# Patient Record
Sex: FEMALE | URBAN - METROPOLITAN AREA
[De-identification: names, ages, dates, MRNs, and addresses within clinical notes are randomized per-mention and may not be internally consistent; named-entity substitution may affect disease eponyms.]

---

## 2021-08-17 ENCOUNTER — APPOINTMENT (RX ONLY)
Dept: URBAN - METROPOLITAN AREA CLINIC 110 | Facility: CLINIC | Age: 66
Setting detail: DERMATOLOGY
End: 2021-08-17

## 2021-08-17 DIAGNOSIS — L65.8 OTHER SPECIFIED NONSCARRING HAIR LOSS: ICD-10-CM

## 2021-08-17 PROCEDURE — 99204 OFFICE O/P NEW MOD 45 MIN: CPT

## 2021-08-17 PROCEDURE — ? PRODUCT LINE (HAIR PRODUCTS)

## 2021-08-17 PROCEDURE — ? COUNSELING

## 2021-08-17 PROCEDURE — ? DIAGNOSIS COMMENT

## 2021-08-17 PROCEDURE — ? PRESCRIPTION MEDICATION MANAGEMENT

## 2021-08-17 PROCEDURE — ? PRESCRIPTION

## 2021-08-17 RX ORDER — PHARMACY COMPOUNDING ACCESSORY
EACH MISCELLANEOUS
Qty: 1 | Refills: 3 | Status: ERX | COMMUNITY
Start: 2021-08-17

## 2021-08-17 RX ADMIN — Medication: at 00:00

## 2021-08-17 ASSESSMENT — LOCATION SIMPLE DESCRIPTION DERM: LOCATION SIMPLE: SCALP

## 2021-08-17 ASSESSMENT — LOCATION ZONE DERM: LOCATION ZONE: SCALP

## 2021-08-17 ASSESSMENT — LOCATION DETAILED DESCRIPTION DERM: LOCATION DETAILED: LEFT SUPERIOR PARIETAL SCALP

## 2021-08-17 NOTE — PROCEDURE: PRODUCT LINE (HAIR PRODUCTS)
Product 18 Units: 0
Risk Of Complication Category: No MDM
Product 1 Price (In Dollars - Numeric Only, No Special Characters Or $): 160
Product 15 Price (In Dollars - Numeric Only, No Special Characters Or $): 0.00
Render Product Pricing In Note: Yes
Product 1 Units: 1
Detail Level: Zone
Product 1 Application Directions: Take PO twice daily
Name Of Product 1: Viviscal Supplement (3 month supply)
Assigning Risk Information: Per AMA, level of risk is based upon consequences of the problem(s) addressed at the encounter when appropriately treated. Risk also includes medical decision making related to the need to initiate or forego further testing, treatment and/or hospitalization. Over the counter medication are assigned a risk level of low. Prescription medication management is assigned a risk level of moderate.

## 2021-08-17 NOTE — PROCEDURE: DIAGNOSIS COMMENT
Comment: Mother has hx of hair loss \\nChronic, flaring\\nHas been slowly progressive for years\\nDoes have hypothyroid but now well controlled per daughter\\n\\nTreatment: \\n- Viviscal supplements or Nutrafol \\n- Topical compound solution\\n- Discussed PRP as well, daughter is interested, explained helps preserve hair and in many pts will help regrowth.
Render Risk Assessment In Note?: no
Detail Level: Simple

## 2021-08-17 NOTE — PROCEDURE: PRESCRIPTION MEDICATION MANAGEMENT
Initiate Treatment: - Start pharmacy compounding accessory \\nSig: please compound minoxidil 0.7%, Spironolactone0.5%, retinoic acid 0.0125% Sig: Apply nightly to scalp
Render In Strict Bullet Format?: No
Detail Level: Zone
Plan: Discussed adding In Viviscal or Nutrafol supplements daily
